# Patient Record
Sex: FEMALE
[De-identification: names, ages, dates, MRNs, and addresses within clinical notes are randomized per-mention and may not be internally consistent; named-entity substitution may affect disease eponyms.]

---

## 2018-06-11 ENCOUNTER — HOSPITAL ENCOUNTER (EMERGENCY)
Dept: HOSPITAL 97 - ER | Age: 23
LOS: 1 days | Discharge: HOME | End: 2018-06-12
Payer: COMMERCIAL

## 2018-06-11 DIAGNOSIS — R07.9: Primary | ICD-10-CM

## 2018-06-11 DIAGNOSIS — J45.909: ICD-10-CM

## 2018-06-11 LAB
ALBUMIN SERPL BCP-MCNC: 4.2 G/DL (ref 3.2–5.5)
ALP SERPL-CCNC: 63 IU/L (ref 42–121)
ALT SERPL W P-5'-P-CCNC: 45 IU/L (ref 10–60)
AMYLASE SERPL-CCNC: 43 U/L (ref 28–100)
AST SERPL W P-5'-P-CCNC: 32 IU/L (ref 10–42)
BUN BLD-MCNC: 10 MG/DL (ref 6–20)
GLUCOSE SERPLBLD-MCNC: 102 MG/DL (ref 65–120)
HCT VFR BLD CALC: 39 % (ref 36–45)
LIPASE SERPL-CCNC: 30 U/L (ref 22–51)
LYMPHOCYTES # SPEC AUTO: 3.8 K/UL (ref 0.7–4.9)
MCH RBC QN AUTO: 27.1 PG (ref 27–35)
MCV RBC: 80.5 FL (ref 80–100)
PMV BLD: 9.3 FL (ref 7.6–11.3)
POTASSIUM SERPL-SCNC: 3.7 MEQ/L (ref 3.6–5)
RBC # BLD: 4.85 M/UL (ref 3.86–4.86)

## 2018-06-11 PROCEDURE — 81003 URINALYSIS AUTO W/O SCOPE: CPT

## 2018-06-11 PROCEDURE — 74177 CT ABD & PELVIS W/CONTRAST: CPT

## 2018-06-11 PROCEDURE — 81025 URINE PREGNANCY TEST: CPT

## 2018-06-11 PROCEDURE — 36415 COLL VENOUS BLD VENIPUNCTURE: CPT

## 2018-06-11 PROCEDURE — 80048 BASIC METABOLIC PNL TOTAL CA: CPT

## 2018-06-11 PROCEDURE — 83690 ASSAY OF LIPASE: CPT

## 2018-06-11 PROCEDURE — 81015 MICROSCOPIC EXAM OF URINE: CPT

## 2018-06-11 PROCEDURE — 80076 HEPATIC FUNCTION PANEL: CPT

## 2018-06-11 PROCEDURE — 85025 COMPLETE CBC W/AUTO DIFF WBC: CPT

## 2018-06-11 PROCEDURE — 82150 ASSAY OF AMYLASE: CPT

## 2018-06-11 PROCEDURE — 99284 EMERGENCY DEPT VISIT MOD MDM: CPT

## 2018-06-11 PROCEDURE — 71260 CT THORAX DX C+: CPT

## 2018-06-12 LAB — UA COMPLETE W REFLEX CULTURE PNL UR: (no result)

## 2018-06-12 NOTE — RAD REPORT
EXAM DESCRIPTION:  CT - Chest Abdomen Pelvis W Cont - 6/12/2018 3:40 am

 

CLINICAL HISTORY:   Chest and abdominal pain with shortness of breath

 

COMPARISON:   CT chest May 12, 2017

 

TECHNIQUE:  Computed axial tomography of the chest, abdomen and pelvis was obtained. 100 cc Isovue-30
0 was administered intravenously. Oral contrast was not requested. This limits evaluation of bowel.A 
preliminary report was generated by [employer] and reviewed prior to this dictation

 

All CT scans are performed using dose optimization technique as appropriate and may include automated
 exposure control or mA/KV adjustment according to patient size.

 

FINDINGS:  A pleural effusion is not present. A pericardial effusion is not present.

 

The lungs are clear

 

No mediastinal or hilar lymphadenopathy is seen

 

The liver is mildly enlarged.

 

Spleen, pancreas, adrenals and kidneys appear unremarkable.

 

The appendix is normal. There is no evidence of diverticulitis. An adnexal mass is not seen. A trace 
amount of free fluid in the pelvis probably is physiologic

 

A tiny umbilical hernia is present

 

IMPRESSION:  Unremarkable CT chest

 

Mild hepatomegaly

## 2018-06-12 NOTE — EDPHYS
Physician Documentation                                                                           

 [facility]                                                                

Name: Estefany Oseguera                                                                             

Age: 22 yrs                                                                                       

Sex: Female                                                                                       

: 1995                                                                                   

MRN: [id number]                                                                                   

Arrival Date: 2018                                                                          

Time: 22:19                                                                                       

Account#: [id number]                                                                            

Bed 6                                                                                             

Private MD:                                                                                       

ED Physician Lalo Thorpe                                                                             

HPI:                                                                                              

                                                                                             

22:41 This 22 yrs old  Female presents to ER via Ambulatory with complaints of        pkl 

      Abdominal Pain, Shortness Of Breath.                                                        

22:41 The patient presents with abdominal pain in the upper abdomen. Onset: The               pkl 

      symptoms/episode began/occurred 1 week(s) ago. The symptoms do not radiate. Associated      

      signs and symptoms: Pertinent positives: chest pain, shortness of breath.                   

                                                                                                  

Historical:                                                                                       

- Allergies:                                                                                      

22:35 No Known Allergies;                                                                     mg2 

- Home Meds:                                                                                      

22:35 Albuterol Inhl [Active];                                                                mg2 

- PMHx:                                                                                           

22:35 Asthma; ADD/ADHD;                                                                       mg2 

- PSHx:                                                                                           

22:35 Tonsillectomy;                                                                          mg2 

                                                                                                  

- Immunization history:: Flu vaccine is not up to date.                                           

- Social history:: Smoking status: Patient/guardian denies using tobacco,                         

  Patient/guardian denies using alcohol, street drugs, IV drugs.                                  

- Ebola Screening: : No symptoms or risks identified at this time.                                

                                                                                                  

                                                                                                  

ROS:                                                                                              

22:41 Eyes: Negative for injury, pain, redness, and discharge, ENT: Negative for injury,      pkl 

      pain, and discharge, Neck: Negative for injury, pain, and swelling.                         

22:41 Cardiovascular: Positive for chest pain.                                                    

22:41 Respiratory: Positive for shortness of breath.                                              

22:41 Abdomen/GI: Positive for abdominal pain, Negative for abdominal pain, nausea, vomiting,     

      and diarrhea.                                                                               

22:41 Back: Negative for acute changes.                                                           

22:41 : Negative for urinary symptoms.                                                          

22:41 MS/extremity: Negative for acute changes.                                                   

22:41 Skin: Negative for rash.                                                                    

22:41 Neuro: Negative for altered mental status.                                                  

                                                                                                  

Exam:                                                                                             

22:41 Head/Face:  Normocephalic, atraumatic. Eyes:  Pupils equal round and reactive to light, pkl 

      extra-ocular motions intact.  Lids and lashes normal.  Conjunctiva and sclera are           

      non-icteric and not injected.  Cornea within normal limits.  Periorbital areas with no      

      swelling, redness, or edema. ENT:  Nares patent. No nasal discharge, no septal              

      abnormalities noted.  Tympanic membranes are normal and external auditory canals are        

      clear.  Oropharynx with no redness, swelling, or masses, exudates, or evidence of           

      obstruction, uvula midline.  Mucous membranes moist. Neck:  Trachea midline, no             

      thyromegaly or masses palpated, and no cervical lymphadenopathy.  Supple, full range of     

      motion without nuchal rigidity, or vertebral point tenderness.  No Meningismus.             

      Chest/axilla:  Normal chest wall appearance and motion.  Nontender with no deformity.       

      No lesions are appreciated. Cardiovascular:  Regular rate and rhythm with a normal S1       

      and S2.  No gallops, murmurs, or rubs.  Normal PMI, no JVD.  No pulse deficits.             

      Respiratory:  Lungs have equal breath sounds bilaterally, clear to auscultation and         

      percussion.  No rales, rhonchi or wheezes noted.  No increased work of breathing, no        

      retractions or nasal flaring.                                                               

22:41 Abdomen/GI: Bowel sounds: normal, Palpation: soft, mild abdominal tenderness, in the        

      right upper quadrant and left upper quadrant.                                               

22:41 Back: Exam negative for acute changes.                                                      

22:41 : Exam negative for acute changes.                                                        

22:41 Musculoskeletal/extremity: Exam is negative for acute changes.                              

22:41 Skin: Exam negative for rash.                                                               

22:41 Neuro: Orientation: is normal, Mentation: is normal, Cranial nerves: grossly normal,        

      Motor: is normal.                                                                           

                                                                                                  

Vital Signs:                                                                                      

22:36  / 96; Pulse 88; Resp 18; Temp 97.1(O); Pulse Ox 98% on R/A; Weight 68.04 kg;     mg2 

      Height 4 ft. 10 in. (147.32 cm); Pain 7/10;                                                 

23:35  / 94; Pulse 82; Resp 16; Pulse Ox 99% on R/A;                                    ao  

                                                                                             

01:00  / 88; Pulse 72; Resp 18; Pulse Ox 100% ;                                         ao  

                                                                                             

22:36 Body Mass Index 31.35 (68.04 kg, 147.32 cm)                                             mg2 

                                                                                                  

MDM:                                                                                              

                                                                                             

22:35 Patient medically screened.                                                             pkl 

                                                                                             

01:13 Data reviewed: vital signs, nurses notes, lab test result(s), radiologic studies, CT    pkl 

      scan.                                                                                       

                                                                                                  

                                                                                             

22:39 Order name: Amylase, Serum; Complete Time: 23:27                                        pkl 

                                                                                             

22:39 Order name: Basic Metabolic Panel; Complete Time: 23:27                                 pkl 

                                                                                             

22:39 Order name: CBC with Diff; Complete Time: 23:27                                         pkl 

                                                                                             

22:39 Order name: Creatinine for Radiology; Complete Time: 23:27                              pkl 

                                                                                             

22:39 Order name: Hepatic Function; Complete Time: 23:27                                      pkl 

                                                                                             

22:39 Order name: Lipase; Complete Time: 23:27                                                pkl 

                                                                                             

22:39 Order name: Urine Microscopic Only; Complete Time: 01:11                                pkl 

                                                                                             

22:39 Order name: IV Saline Lock; Complete Time: 22:54                                        pkl 

                                                                                             

22:39 Order name: Labs collected and sent; Complete Time: 22:54                               pkl 

                                                                                             

23:05 Order name: Urine Dipstick--Ancillary (enter results)                                   ms  

                                                                                             

23:05 Order name: Urine Pregnancy--Ancillary (enter results)                                  ms  

                                                                                             

23:52 Order name: Chest Abdomen Pelvis W Cont                                                 EDMS

                                                                                             

22:39 Order name: Urine Dipstick-Ancillary (obtain specimen); Complete Time: 23:34            pkl 

                                                                                                  

Administered Medications:                                                                         

                                                                                             

23:17 Drug: NS 0.9% 1000 ml Route: IV; Rate: 125 ml/hr; Site: right antecubital;              mg2 

                                                                                                  

                                                                                                  

Disposition:                                                                                      

18 01:14 Discharged to Home. Impression: Chest pain. Abdominal pain.                        

- Condition is Stable.                                                                            

                                                                                                  

- Prescriptions for Protonix 40 mg Oral Tablet, Delayed Release (E.C.) - take 1 tablet            

  by ORAL route once daily; 15 tablet.                                                            

- Work release form, Medication Reconciliation Form, Thank You Letter, Antibiotic                 

  Education, Prescription Opioid Use form.                                                        

- Follow up: Private Physician; When: 2 - 3 days; Reason: Re-evaluation by your                   

  physician.                                                                                      

- Problem is new.                                                                                 

- Symptoms have improved.                                                                         

                                                                                                  

                                                                                                  

                                                                                                  

Signatures:                                                                                       

Dispatcher [employer]                           [facility]                                                 

Lalo Thorpe MD MD   pkJose Aj RN                         RN   Barber Conti RN                    RN   mg2                                                  

                                                                                                  

Corrections: (The following items were deleted from the chart)                                    

23:52 23:29 Chest Abdomen W/ Con+CT.RAD.BRZ ordered. [facility]

                                                                                             

01:29 01:14 2018 01:14 Discharged to Home. Impression: Chest pain. Abdominal pain.      ao  

      Condition is Stable. Forms are Medication Reconciliation Form, Thank You Letter,            

      Antibiotic Education, Prescription Opioid Use. Follow up: Private Physician; When: 2 -      

      3 days; Reason: Re-evaluation by your physician. Problem is new. Symptoms have              

      improved. pkl                                                                               

                                                                                                  

**************************************************************************************************